# Patient Record
Sex: MALE | Race: WHITE | NOT HISPANIC OR LATINO | Employment: PART TIME | URBAN - METROPOLITAN AREA
[De-identification: names, ages, dates, MRNs, and addresses within clinical notes are randomized per-mention and may not be internally consistent; named-entity substitution may affect disease eponyms.]

---
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
## 2024-07-07 ENCOUNTER — APPOINTMENT (OUTPATIENT)
Dept: GENERAL RADIOLOGY | Facility: CLINIC | Age: 19
End: 2024-07-07
Attending: EMERGENCY MEDICINE
Payer: COMMERCIAL

## 2024-07-07 ENCOUNTER — HOSPITAL ENCOUNTER (EMERGENCY)
Facility: CLINIC | Age: 19
Discharge: HOME | End: 2024-07-07
Attending: EMERGENCY MEDICINE | Admitting: EMERGENCY MEDICINE
Payer: COMMERCIAL

## 2024-07-07 VITALS
TEMPERATURE: 99.3 F | RESPIRATION RATE: 18 BRPM | HEIGHT: 75 IN | BODY MASS INDEX: 26.11 KG/M2 | SYSTOLIC BLOOD PRESSURE: 145 MMHG | HEART RATE: 86 BPM | WEIGHT: 210 LBS | OXYGEN SATURATION: 100 % | DIASTOLIC BLOOD PRESSURE: 82 MMHG

## 2024-07-07 DIAGNOSIS — R07.81 PLEURITIC CHEST PAIN: ICD-10-CM

## 2024-07-07 PROCEDURE — 71101 X-RAY EXAM UNILAT RIBS/CHEST: CPT | Mod: LT

## 2024-07-07 PROCEDURE — 250N000013 HC RX MED GY IP 250 OP 250 PS 637: Performed by: EMERGENCY MEDICINE

## 2024-07-07 PROCEDURE — 99283 EMERGENCY DEPT VISIT LOW MDM: CPT | Performed by: EMERGENCY MEDICINE

## 2024-07-07 RX ORDER — IBUPROFEN 100 MG/5ML
800 SUSPENSION, ORAL (FINAL DOSE FORM) ORAL ONCE
Status: COMPLETED | OUTPATIENT
Start: 2024-07-07 | End: 2024-07-07

## 2024-07-07 RX ORDER — LIDOCAINE 4 G/G
2 PATCH TOPICAL ONCE
Status: DISCONTINUED | OUTPATIENT
Start: 2024-07-07 | End: 2024-07-07 | Stop reason: HOSPADM

## 2024-07-07 RX ORDER — PREDNISOLONE 15 MG/5 ML
20 SOLUTION, ORAL ORAL DAILY
Qty: 46.9 ML | Refills: 0 | Status: SHIPPED | OUTPATIENT
Start: 2024-07-07 | End: 2024-07-14

## 2024-07-07 RX ADMIN — LIDOCAINE 2 PATCH: 4 PATCH TOPICAL at 12:04

## 2024-07-07 RX ADMIN — IBUPROFEN 800 MG: 100 SUSPENSION ORAL at 12:05

## 2024-07-07 ASSESSMENT — COLUMBIA-SUICIDE SEVERITY RATING SCALE - C-SSRS
2. HAVE YOU ACTUALLY HAD ANY THOUGHTS OF KILLING YOURSELF IN THE PAST MONTH?: NO
1. IN THE PAST MONTH, HAVE YOU WISHED YOU WERE DEAD OR WISHED YOU COULD GO TO SLEEP AND NOT WAKE UP?: NO
6. HAVE YOU EVER DONE ANYTHING, STARTED TO DO ANYTHING, OR PREPARED TO DO ANYTHING TO END YOUR LIFE?: NO

## 2024-07-07 ASSESSMENT — ACTIVITIES OF DAILY LIVING (ADL)
ADLS_ACUITY_SCORE: 35
ADLS_ACUITY_SCORE: 35

## 2024-07-07 NOTE — ED TRIAGE NOTES
Pt states that yesterday he started to have back pain.  Pt states that it hurts to breath.  Pt states that the pain is on the left upper back near the shoulder blade.  Pt was possibly lifting a bucket of dough at work when the pain started.  Pt has not taken anything for pain prior to arrival.      Triage Assessment (Adult)       Row Name 07/07/24 1109          Triage Assessment    Airway WDL WDL        Respiratory WDL    Respiratory WDL WDL        Skin Circulation/Temperature WDL    Skin Circulation/Temperature WDL WDL        Cardiac WDL    Cardiac WDL WDL        Peripheral/Neurovascular WDL    Peripheral Neurovascular WDL WDL        Cognitive/Neuro/Behavioral WDL    Cognitive/Neuro/Behavioral WDL WDL

## 2024-07-07 NOTE — ED PROVIDER NOTES
"  History     Chief Complaint   Patient presents with    Back Pain     HPI  Joel Parrish is a 18 year old male who presents with concern of left-sided back pain.  It is located in his upper back over shoulder blade.  He was at work and lifted something heavy.  He said that he felt a pop and had pain in his upper back.  Pain is worse when he is taking a deep breath.  Also feels very tender to touch.  Has not taken any medication to help with his symptoms.  He feels like the pain is now wrapping around from his upper back along his ribs.  Has not been running a fever.  No cough.  No chest pain.  He does have a history of mild intermittent asthma and previously had a pneumothorax as an infant, and was in NICU for 2 months.  Has not had any recurrent pneumothorax.    Allergies:  Allergies   Allergen Reactions    No Known Drug Allergy        Problem List:    Patient Active Problem List    Diagnosis Date Noted    Hyperlipidemia LDL goal <160 07/21/2023     Priority: Medium    Overweight (BMI 25.0-29.9) 07/21/2023     Priority: Medium    Acne vulgaris 12/21/2019     Priority: Medium    Adjustment disorder with mixed anxiety and depressed mood 11/05/2019     Priority: Medium    Mild intermittent asthma without complication 11/16/2016     Priority: Medium    In-toeing, unspecified laterality 11/16/2015     Priority: Medium    Learning disorder 10/13/2015     Priority: Medium    Autism spectrum disorder 06/25/2015     Priority: Medium    EEG abnormality 05/30/2015     Priority: Medium     1/6/15 EEG showing borderline abnormal EEG for age,  \"perhaps mild disorganization of background\", no epileptiform changes, scanned to Epic chart      Constipation 01/19/2014     Priority: Medium    ADHD (attention deficit hyperactivity disorder), combined type 04/03/2013     Priority: Medium     Date diagnosed: 3/13/13  Diagnosed by:  Dr. Sharmin Calixto  Medications tried and any medication reactions: Metadate CD " 10mg  _________________________________  Last office visit for ADHD:  11/16/201    Next visit due: February 2017    Next Lawrence/Azar due:  Teacher will complete Lawrence ADHD Assessment Follow-up Scales in January 2016 02/08/2017 Teacher lawrence received, scored and filed at  desk. DW          Past Medical History:    Past Medical History:   Diagnosis Date    ADHD (attention deficit hyperactivity disorder) 3/2013    Pneumothorax        Past Surgical History:    Past Surgical History:   Procedure Laterality Date    HC CIRCUMCISION SURGICAL NON-DEV >28 DAYS AGE  10/06/05       Family History:    Family History   Problem Relation Age of Onset    Allergies Mother         codiene    Depression Mother     Hypertension Maternal Grandmother     Eye Disorder Maternal Grandmother     Gastrointestinal Disease Maternal Grandmother         gall stones    Cancer - colorectal Maternal Grandmother     Respiratory Brother         half brother-asthma    Heart Disease Brother         half brother    Thyroid Disease Other     Diabetes Maternal Grandfather     Hypertension Maternal Grandfather        Social History:  Marital Status:  Single [1]  Social History     Tobacco Use    Smoking status: Never     Passive exposure: Yes    Smokeless tobacco: Never    Tobacco comments:     outside of home   Vaping Use    Vaping status: Never Used   Substance Use Topics    Alcohol use: No     Alcohol/week: 0.0 standard drinks of alcohol    Drug use: No        Medications:    prednisoLONE (ORAPRED/PRELONE) 15 MG/5ML solution  adapalene (DIFFERIN) 0.1 % external cream  adapalene (DIFFERIN) 0.1 % external gel  albuterol (PROAIR HFA/PROVENTIL HFA/VENTOLIN HFA) 108 (90 Base) MCG/ACT inhaler  albuterol (PROVENTIL) (2.5 MG/3ML) 0.083% neb solution  cetirizine (ZYRTEC) 10 MG tablet  ibuprofen (ADVIL/MOTRIN) 100 MG/5ML suspension  melatonin 3 MG tablet  ORDER FOR [facility]  Respiratory Therapy Supplies (NEBULIZER/TUBING/MOUTHPIECE)  "KIT  Spacer/Aero-Holding Chambers (OPTICHAMBER BRODIE-MD MASK) MISC  tretinoin (RETIN-A) 0.1 % external cream          Review of Systems   All other systems reviewed and are negative.      Physical Exam   BP: (!) 145/82  Pulse: 86  Temp: 99.3  F (37.4  C)  Resp: 18  Height: 189.2 cm (6' 2.5\")  Weight: 95.3 kg (210 lb)  SpO2: 100 %      Physical Exam  Vitals and nursing note reviewed.   Constitutional:       General: He is not in acute distress.     Appearance: He is not toxic-appearing.   Cardiovascular:      Rate and Rhythm: Normal rate and regular rhythm.   Pulmonary:      Effort: Pulmonary effort is normal. No retractions.      Breath sounds: Normal breath sounds. No stridor. No wheezing.          Comments: Generalized tenderness to palpation. No crepitus  Chest:      Chest wall: Tenderness present.          Comments: Tenderness to palpation over left lateral ribs. No crepitus or step-off to suggest rib fracture  Skin:     General: Skin is warm and dry.      Findings: Acne present.      Comments: Scattered acne and pustules across the entirety of trunk, but no vesicles or skin lesions worrisome for rash, herpes zoster, or other infectious cause   Neurological:      Mental Status: He is alert.         ED Course        Procedures              Critical Care time:  none               Results for orders placed or performed during the hospital encounter of 07/07/24 (from the past 24 hour(s))   Ribs XR, unilat 3 views + PA chest,  left    Narrative    EXAM: XR RIBS AND CHEST LEFT 3 VIEWS  LOCATION: [facility]  DATE: 7/7/2024    INDICATION: Pleuritic chest pain, left sided rib pain, no known injury  COMPARISON: 9/8/2010      Impression    IMPRESSION: The visualized heart and lungs are negative. No rib fractures.       Medications   Lidocaine (LIDOCARE) 4 % Patch 2 patch (2 patches Transdermal $Patch/Med Applied 7/7/24 8154)   ibuprofen (ADVIL/MOTRIN) suspension 800 mg (800 mg Oral $Given " 7/7/24 5730)       Assessments & Plan (with Medical Decision Making)  Joel is a 18-year-old male presenting with back pain that started yesterday.  See history and physical exam as above  18-year-old male in no acute distress, is mildly hypertensive with blood pressure of 145/82, but otherwise vitally stable and afebrile.  He is breathing comfortably, oxygenating 100% on room air, no stridor, no tripoding.  Does not appear to have increased work of breathing or accessory muscle usage.  Lung sounds are clear throughout all fields, no adventitious sounds are appreciated.  Will plan to get chest and rib x-ray, but have low suspicion for any traumatic injury as patient did not have any fall or known trauma.  Offered some medication to help with pain since he has not yet had anything today, will give ibuprofen and lidocaine patch  X-ray results as above.  No evidence of rib fracture, no evidence of pleural effusion, consolidation, or pneumothorax.  Updated patient and mother on findings.  Suspect that his symptoms may be due to pleurisy, but could also be musculoskeletal.  Would recommend anti-inflammatories.  He has difficulty swallowing pills, and would need to take large amount of ibuprofen suspension.  Will plan to do prednisolone since this may be easier tolerated and not need such a large volume per dose.  Recommended oral Tylenol and over-the-counter lidocaine patches if desired.  He should follow-up with his primary provider in clinic if symptoms or not improving.  ED return precautions discussed for any worsening symptoms.  Discharged in stable condition     I have reviewed the nursing notes.    I have reviewed the findings, diagnosis, plan and need for follow up with the patient.           Medical Decision Making  The patient's presentation was of low complexity (an acute and uncomplicated illness or injury).    The patient's evaluation involved:  ordering and/or review of 1 test(s) in this encounter (see  separate area of note for details)    The patient's management necessitated moderate risk (prescription drug management including medications given in the ED).        New Prescriptions    PREDNISOLONE (ORAPRED/PRELONE) 15 MG/5ML SOLUTION    Take 6.7 mLs (20 mg) by mouth daily for 7 days       Final diagnoses:   Pleuritic chest pain       7/7/2024   [facility] EMERGENCY DEPT       Citlali Mendez DO  07/07/24 2057

## 2024-07-07 NOTE — DISCHARGE INSTRUCTIONS
Your x-ray did not show any sign of collapsed lung, infection, broken ribs, or any new concerns    You should take the anti-inflammatory medication to help with your symptoms.    Follow-up with your primary provider in clinic within 1 to 2 weeks for reassessment    If you develop significant new or worsening symptoms, such as fever, vomiting, coughing up blood, or any worsening symptoms, return promptly to the ER for evaluation

## 2024-07-07 NOTE — Clinical Note
Joel Parrish was seen and treated in our emergency department on 7/7/2024.  He may return to work on 07/08/2024.       If you have any questions or concerns, please don't hesitate to call.      Citlali Mendez, DO

## 2024-07-08 ENCOUNTER — PATIENT OUTREACH (OUTPATIENT)
Dept: PEDIATRICS | Facility: OTHER | Age: 19
End: 2024-07-08
Payer: COMMERCIAL

## 2024-07-08 NOTE — TELEPHONE ENCOUNTER
Patient Contact    Attempt # 1    RN did attempt to reach Patient. Mom answered and stated that patient was resting (No C2C on file). RN asked mom to have patient call back. She agreed and will relay message.     Saima Fernandez RN on 7/8/2024 at 1:51 PM

## 2025-03-08 NOTE — LETTER
My Asthma Action Plan    Name: Joel Parrish   YOB: 2005  Date: 7/21/2023   My doctor: Marcelo Greenfield PA-C   My clinic: [facility]        My Rescue Medicine:   Albuterol inhaler (Proair/Ventolin/Proventil HFA)  2-4 puffs EVERY 4 HOURS as needed. Use a spacer if recommended by your provider.   My Asthma Severity:   Intermittent / Exercise Induced  Know your asthma triggers: smoke, upper respiratory infections, exercise or sports, and cold air  Patient is unaware of triggers          GREEN ZONE   Good Control  I feel good  No cough or wheeze  Can work, sleep and play without asthma symptoms       Take your asthma control medicine every day.     If exercise triggers your asthma, take your rescue medication  15 minutes before exercise or sports, and  During exercise if you have asthma symptoms  Spacer to use with inhaler: If you have a spacer, make sure to use it with your inhaler             YELLOW ZONE Getting Worse  I have ANY of these:  I do not feel good  Cough or wheeze  Chest feels tight  Wake up at night   Keep taking your Green Zone medications  Start taking your rescue medicine:  every 20 minutes for up to 1 hour. Then every 4 hours for 24-48 hours.  If you stay in the Yellow Zone for more than 12-24 hours, contact your doctor.  If you do not return to the Green Zone in 12-24 hours or you get worse, start taking your oral steroid medicine if prescribed by your provider.           RED ZONE Medical Alert - Get Help  I have ANY of these:  I feel awful  Medicine is not helping  Breathing getting harder  Trouble walking or talking  Nose opens wide to breathe       Take your rescue medicine NOW  If your provider has prescribed an oral steroid medicine, start taking it NOW  Call your doctor NOW  If you are still in the Red Zone after 20 minutes and you have not reached your doctor:  Take your rescue medicine again and  Call 911 or go to the emergency room right away    See  your regular doctor within 2 weeks of an Emergency Room or Urgent Care visit for follow-up treatment.          Annual Reminders:  Meet with Asthma Educator,  Flu Shot in the Fall, consider Pneumonia Vaccination for patients with asthma (aged 19 and older).    Pharmacy:    [facility], [place] - [address] [place] [address]  [id number] - [place], [place] - [address] [place] [address]    Electronically signed by Marcelo Greenfield PA-C   Date: 07/21/23                    Asthma Triggers  How To Control Things That Make Your Asthma Worse    Triggers are things that make your asthma worse.  Look at the list below to help you find your triggers and   what you can do about them. You can help prevent asthma flare-ups by staying away from your triggers.      Trigger                                                          What you can do   Cigarette Smoke  Tobacco smoke can make asthma worse. Do not allow smoking in your home, car or around you.  Be sure no one smokes at a child s day care or school.  If you smoke, ask your health care provider for ways to help you quit.  Ask family members to quit too.  Ask your health care provider for a referral to Quit Plan to help you quit smoking, or call 0-119-090-PLAN.     Colds, Flu, Bronchitis  These are common triggers of asthma. Wash your hands often.  Don t touch your eyes, nose or mouth.  Get a flu shot every year.     Dust Mites  These are tiny bugs that live in cloth or carpet. They are too small to see. Wash sheets and blankets in hot water every week.   Encase pillows and mattress in dust mite proof covers.  Avoid having carpet if you can. If you have carpet, vacuum weekly.   Use a dust mask and HEPA vacuum.   Pollen and Outdoor Mold  Some people are allergic to trees, grass, or weed pollen, or molds. Try to keep your windows closed.  Limit time out doors when pollen count is high.   Ask you health care provider about taking medicine during allergy season.     Animal Dander  Some  people are allergic to skin flakes, urine or saliva from pets with fur or feathers. Keep pets with fur or feathers out of your home.    If you can t keep the pet outdoors, then keep the pet out of your bedroom.  Keep the bedroom door closed.  Keep pets off cloth furniture and away from stuffed toys.     Mice, Rats, and Cockroaches  Some people are allergic to the waste from these pests.   Cover food and garbage.  Clean up spills and food crumbs.  Store grease in the refrigerator.   Keep food out of the bedroom.   Indoor Mold  This can be a trigger if your home has high moisture. Fix leaking faucets, pipes, or other sources of water.   Clean moldy surfaces.  Dehumidify basement if it is damp and smelly.   Smoke, Strong Odors, and Sprays  These can reduce air quality. Stay away from strong odors and sprays, such as perfume, powder, hair spray, paints, smoke incense, paint, cleaning products, candles and new carpet.   Exercise or Sports  Some people with asthma have this trigger. Be active!  Ask your doctor about taking medicine before sports or exercise to prevent symptoms.    Warm up for 5-10 minutes before and after sports or exercise.     Other Triggers of Asthma  Cold air:  Cover your nose and mouth with a scarf.  Sometimes laughing or crying can be a trigger.  Some medicines and food can trigger asthma.      No